# Patient Record
Sex: MALE | Race: WHITE
[De-identification: names, ages, dates, MRNs, and addresses within clinical notes are randomized per-mention and may not be internally consistent; named-entity substitution may affect disease eponyms.]

---

## 2019-11-03 ENCOUNTER — HOSPITAL ENCOUNTER (EMERGENCY)
Dept: HOSPITAL 63 - ER | Age: 39
LOS: 1 days | Discharge: HOME | End: 2019-11-04
Payer: OTHER GOVERNMENT

## 2019-11-03 VITALS
WEIGHT: 220 LBS | DIASTOLIC BLOOD PRESSURE: 78 MMHG | BODY MASS INDEX: 43.19 KG/M2 | HEIGHT: 60 IN | SYSTOLIC BLOOD PRESSURE: 142 MMHG

## 2019-11-03 DIAGNOSIS — J40: ICD-10-CM

## 2019-11-03 DIAGNOSIS — J06.9: Primary | ICD-10-CM

## 2019-11-03 PROCEDURE — 94640 AIRWAY INHALATION TREATMENT: CPT

## 2019-11-03 PROCEDURE — 87880 STREP A ASSAY W/OPTIC: CPT

## 2019-11-03 PROCEDURE — 71046 X-RAY EXAM CHEST 2 VIEWS: CPT

## 2019-11-03 PROCEDURE — 99285 EMERGENCY DEPT VISIT HI MDM: CPT

## 2019-11-03 PROCEDURE — 87070 CULTURE OTHR SPECIMN AEROBIC: CPT

## 2019-11-03 PROCEDURE — 87804 INFLUENZA ASSAY W/OPTIC: CPT

## 2019-11-03 NOTE — ED.ADGEN
Past History


Past Medical History:  Bronchitis





Adult General


Chief Complaint


Chief Complaint


".... I am been sick two weeks with this cough.. it is to the point .. I can't 

sleep.. my wife and daughter.. has been sick 3 weeks... they put my daughter on 

antibiotics.. I go out to [place] this next week.. "





HPI


HPI





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 

complaints cough x 2 weeks. . Patient also complaining congestion and drainage, 

Family members daughter and wife have also been sick with similar symptoms for 

the past 3 weeks. Reportedly daughter was recently started on antibiotics for 

her cough.  Patient is normally healthy. Up-to-date with vaccinations. No recent

travel. Patient is due to be signed to moderate in [place].





Review of Systems


Review of Systems





Constitutional: History of fever or chills []


Eyes: Denies change in visual acuity, redness, or eye pain []


HENT: History of nasal congestion and sore throat []


Respiratory: History of cough and wheezing]


Cardiovascular: No additional information not addressed in HPI []


GI: Denies abdominal pain, nausea, vomiting, bloody stools or diarrhea []


: Denies dysuria or hematuria []


Musculoskeletal: Denies back pain or joint pain []


Integument: Denies rash or skin lesions []


Neurologic: Denies headache, focal weakness or sensory changes []


Endocrine: Denies polyuria or polydipsia []





All other systems were reviewed and found to be within normal limits, except as 

documented in this note.





Family History


Family History


Wife and daughter upper respiratory infection and cough





Current Medications


Current Medications





Current Medications








 Medications


  (Trade)  Dose


 Ordered  Sig/Drake  Start Time


 Stop Time Status Last Admin


Dose Admin


 


 Albuterol Sulfate


  (Ventolin Hfa


 Inhaler)  2 puff  1X  ONCE  11/3/19 23:45


 19 00:03 DC 11/3/19 23:45


2 PUFF


 


 Azithromycin


  (Zithromax)  500 mg  1X  ONCE  11/3/19 23:45


 19 00:03 DC 11/3/19 23:55


500 MG


 


 Prednisone


  (Prednisone)  50 mg  1X  ONCE  11/3/19 23:45


 19 00:03 DC 11/3/19 23:55


50 MG











Allergies


Allergies





Allergies








Coded Allergies Type Severity Reaction Last Updated Verified


 


  No Known Drug Allergies    19 No











Physical Exam


Physical Exam





Constitutional: Well developed, well nourished, moderate acute distress, non-

toxic appearance. []


HENT: Normocephalic, atraumatic, bilateral external ears normal, oropharynx 

moist, ejected pharynx, no oral exudates, nose swollen turbinates and clear 

rhinorrhea


Eyes: PERRLA, EOMI, conjunctiva normal, no discharge. [] 


Neck: Normal range of motion, no tenderness, supple, no stridor. [] 


Cardiovascular:Heart rate regular rhythm, no murmur []


Lungs & Thorax:  Bilateral breath sounds equal apex with scattered wheezes 

throughout auscultation []


Abdomen: Bowel sounds normal, soft, no tenderness, no masses, no pulsatile 

masses. [] 


Skin: Warm, dry, no erythema, no rash. [] 


Back: No tenderness, no CVA tenderness. [] 


Extremities: No tenderness, no cyanosis, no clubbing, ROM intact, no edema. [] 

No cording in legs.


Neurologic: Alert and oriented X 3, normal motor function, normal sensory 

function, no focal deficits noted. []


Psychologic: Affect normal, judgement normal, mood normal. []





Current Patient Data


Vital Signs





                                   Vital Signs








  Date Time  Temp Pulse Resp B/P (MAP) Pulse Ox O2 Delivery O2 Flow Rate FiO2


 


11/3/19 23:49     95 Room Air  


 


11/3/19 22:59 98.7 75 16     








Lab Results





                                Laboratory Tests








Test


 11/3/19


23:40


 


Influenza Type A (Rapid)


 Negative


(NEGATIVE)


 


Influenza Type B (Rapid)


 Negative


(NEGATIVE)


 


Group A Streptococcus Rapid


 Negative


(NEGATIVE)











EKG


EKG


[]





Radiology/Procedures


Radiology/Procedures


[][facility] [address], [place] [address]


                                 [phone]


                                        


                                 IMAGING REPORT





                                     Signed





PATIENT: PERNELL PASCUAL   ACCOUNT: [id number]     MRN#: [id number]


: 1980           LOCATION: ER              AGE: 39


SEX: M                    EXAM DT: 19         ACCESSION#: [id number]


STATUS: REG ER            ORD. PHYSICIAN: MAX GASPAR MD


REASON: cough, fever x2 weeks


PROCEDURE: CHEST PA & LATERAL





PA and lateral chest.


 


HISTORY: Cough, fever


 


PA and lateral views were taken of the chest. Lungs are free of 


infiltrates. Heart is normal in size. There is no pleural effusion.


 


IMPRESSION:


1. No acute chest disease.


 


Electronically signed by: Gera Morales MD (2019 12:29 AM) [facility]














DICTATED AND SIGNED BY:     GERA MORALES MD


DATE:     19 0029





CC: MAX GASPAR MD; PCP,NO ~





Course & Med Decision Making


Course & Med Decision Making


Pertinent Labs and Imaging studies reviewed. (See chart for details). 





Patient take Tylenol and ibuprofen as needed for pain. May take Vicoprofen 2 

tablets at night for discomfort and cough suppression. Benadryl 25-50 mg 4 times

 a day. Patient take prednisone 50 mg day for. Patient take his Zithromax 250 mg

 a day. Patient uses MDI 2 puffs 4 times a day. Patient follow-up primary care. 

Patient return if any concerns.





[]





Final Impression


Final Impression


1. Upper respiratory infection


2. Bronchitis[]





Dragon Disclaimer


Dragon Disclaimer


This electronic medical record was generated, in whole or in part, using a voice

 recognition dictation system.





Dragon Disclaimer


This chart was dictated in whole or in part using Voice Recognition software in 

a busy, high-work load, and often noisy Emergency Department environment.  It 

may contain unintended and wholly unrecognized errors or omissions.











MAX GASPAR MD            Nov 3, 2019 23:08

## 2019-11-04 LAB
INFLUENZA A PATIENT: NEGATIVE
INFLUENZA B PATIENT: NEGATIVE

## 2019-11-04 NOTE — RAD
PA and lateral chest.

 

HISTORY: Cough, fever

 

PA and lateral views were taken of the chest. Lungs are free of 

infiltrates. Heart is normal in size. There is no pleural effusion.

 

IMPRESSION:

1. No acute chest disease.

 

Electronically signed by: Gera Sheridan MD (11/4/2019 12:29 AM) [facility]